# Patient Record
Sex: FEMALE | Employment: STUDENT | URBAN - METROPOLITAN AREA
[De-identification: names, ages, dates, MRNs, and addresses within clinical notes are randomized per-mention and may not be internally consistent; named-entity substitution may affect disease eponyms.]

---
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
## 2023-02-28 ENCOUNTER — DOCUMENTATION ONLY (OUTPATIENT)
Dept: FAMILY MEDICINE | Facility: CLINIC | Age: 18
End: 2023-02-28

## 2023-02-28 ENCOUNTER — OFFICE VISIT (OUTPATIENT)
Dept: FAMILY MEDICINE | Facility: CLINIC | Age: 18
End: 2023-02-28
Payer: COMMERCIAL

## 2023-02-28 VITALS
TEMPERATURE: 99.2 F | BODY MASS INDEX: 24.15 KG/M2 | DIASTOLIC BLOOD PRESSURE: 82 MMHG | WEIGHT: 178.3 LBS | HEIGHT: 72 IN | HEART RATE: 99 BPM | SYSTOLIC BLOOD PRESSURE: 135 MMHG

## 2023-02-28 DIAGNOSIS — K52.9 GASTROENTERITIS: ICD-10-CM

## 2023-02-28 DIAGNOSIS — R11.0 NAUSEA: Primary | ICD-10-CM

## 2023-02-28 RX ORDER — ONDANSETRON 4 MG/1
8 TABLET, ORALLY DISINTEGRATING ORAL EVERY 8 HOURS PRN
Qty: 20 TABLET | Refills: 0 | Status: SHIPPED | OUTPATIENT
Start: 2023-02-28

## 2023-02-28 NOTE — PROGRESS NOTES
"S: 18 yo UM female  presents with :  -Nausea  -Intermittent lower abd pain  -No emesis or diarrhea  -Woke up this am with abdominal pain lower  -+Chills  -No meds  -AT did a COVID antigen test and it was negative  -Played in a game against a player with GI issues  -Tomorrow is the first day of [employer]    O:  Upset about being sick  /82   Pulse 99   Temp 99.2  F (37.3  C) (Oral)   Ht 1.854 m (6' 1\")   Wt 80.9 kg (178 lb 4.8 oz)   BMI 23.52 kg/m    HEENT: neg  NecK: no lad  Lungs; cta  Heart; increased rate but regular  Abd:  Soft and nttp, no g/r      A:  Probable GI viral illness given high prevalence of norovirus in the community    P:  No practice today  Go home and rest  Zofran 8mg ODT q 8 hours prn nausea  Try to drink Powerade diluted with water  Alfalfa foods a little bit at a time  Recheck tomorrow at shoot around    El Milian, ATC was notified of the plan.   Isa Alonso MD, CAQ, FACSM, [facility]  Sports Medicine and Bone Health  Team Physician;  Athletics    "

## 2023-02-28 NOTE — LETTER
"2/28/2023  RE: Jaz Damon  [address] [place] [address]     Dear Colleague,  Thank you for referring your patient, Jaz Damon, to the [facility] STUDENT ATHLETIC CLINIC. Please see a copy of my visit note below.    S: 18 yo [employer] female  presents with :  -Nausea  -Intermittent lower abd pain  -No emesis or diarrhea  -Woke up this am with abdominal pain lower  -+Chills  -No meds  -AT did a COVID antigen test and it was negative  -Played in a game against a player with GI issues  -Tomorrow is the first day of [employer]    O:  Upset about being sick  /82   Pulse 99   Temp 99.2  F (37.3  C) (Oral)   Ht 1.854 m (6' 1\")   Wt 80.9 kg (178 lb 4.8 oz)   BMI 23.52 kg/m    HEENT: neg  NecK: no lad  Lungs; cta  Heart; increased rate but regular  Abd:  Soft and nttp, no g/r    A:  Probable GI viral illness given high prevalence of norovirus in the community    P:  No practice today  Go home and rest  Zofran 8mg ODT q 8 hours prn nausea  Try to drink Powerade diluted with water  Gila foods a little bit at a time  Recheck tomorrow at shoot around    El Milian, ATC was notified of the plan.   Isa Alonso MD, CAQ, FACSM, [facility]  Sports Medicine and Bone Health  Team Physician;  Athletics  "

## 2023-02-28 NOTE — PROGRESS NOTES
[facility] ATHLETICS  Giovana ATC initial assessment note  Date of service performed: 2/28/2023    Concern: Acute illness  Body part: N/A  Description: N/A  Injury: N/A  Type: Non-athletics related  Date of injury: 2/27/2023    S: During team meal before practice patient was brought over by  for evaluation of an illness.  Patient reports onset of symptoms late yesterday afternoon.  She reports chills/night sweats, body aches, fatigue, nausea and stomach pain.  Her symptoms are worse today.  She is not having any URI symptoms. No loss of taste/smell.  She has not had any vomiting or diarrhea. Recent contact with opponent during [employer] game with GI illness.      O: Normal mood and affect.  Emotional/upset about not feeling well.  Skin temp is warm to the touch.  /82. HR 99.  Oral temp 99.2F. NEGATIVE COVID antigen test.    A: Suspect viral GI illness    P: Referral to team MD for further evaluation and discussion of management plan.  Seen by Dr. Alonso today just shortly after seeing me prior to practice today.  See Epic for clinic note/plan. All questions answered.  Patient was in agreement with he plan.     Diego Milian, YONNY

## 2023-03-02 NOTE — PROGRESS NOTES
[facility] ATHLETICS  Giovana ATC follow-up note  Date of service performed: 3/2/2023    Concern/injury: Illness    Assessment/plan: Received forwarded text message from coaching staff.  Patient was seen in the ER this morning for progression of symptoms. I called patient's mom, Harriet, to discuss.  Harriet reports patient started to feel ill later in the evening while at a basketball game.  She started to have increasing chills and a temp of 102F.  She reported patient woke up this AM with a fever of 104F, L sided flank pain and smelly/discolored urine.  Harriet called their home clinic for appointment, who then called back and instructed to go to ER/urgent care.  Testing included negative mono test and negative US. UA positive for kidney infection.  Patient started on Keflex 500mg QID*7 days. ER visit was at [address] [place] [address] in [place].  We will plan to have patient f/u with Dr. Alonso next week.  Sooner if needed.  I reached out to patient and asked her to f/u if she needed anything or had questions.  I was not aware of the patient's worsening of symptoms last night as she did not communicante this to her medical team.     Diego Milian, ATC

## 2023-03-02 NOTE — PROGRESS NOTES
[facility] ATHLETICS  Giovana ATC follow-up note  Date of service performed: 3/1/2023    Concern/injury: Illness    Assessment/plan: Patient reports for f/u prior to SA this AM.  She is feeling better.  Slept through the night.  No vomiting or diarhea.  No chills/sweats throughout the night.  Woke up with some nauseas, but better since taking Zofran.  She has been able to eat.  Also noted a headache and some body aches which are feeling better.  She notes very mild sore throat today, but no coughing or congestion.  Patient feels she is able to play today.  Oral temp is 97.7F. Discussed case with Dr. Alonso who was in attendance at this AM's SA. Patient cleared to play as tolerated.  She participated in full 30 minute morning shoot around and played 20 mins in the game vs. [employer] @ the [employer].  Patient tolerated the game well. Team will be off now and the season is likely over with the loss this afternoon.      Diego Milian ATC

## 2023-03-14 ENCOUNTER — OFFICE VISIT (OUTPATIENT)
Dept: FAMILY MEDICINE | Facility: CLINIC | Age: 18
End: 2023-03-14
Payer: COMMERCIAL

## 2023-03-14 VITALS
HEART RATE: 77 BPM | SYSTOLIC BLOOD PRESSURE: 135 MMHG | WEIGHT: 176.4 LBS | DIASTOLIC BLOOD PRESSURE: 80 MMHG | BODY MASS INDEX: 23.89 KG/M2 | HEIGHT: 72 IN

## 2023-03-14 DIAGNOSIS — N12 PYELONEPHRITIS: Primary | ICD-10-CM

## 2023-03-14 NOTE — LETTER
"  3/14/2023      RE: Jaz Damon  [address] [facility] [address]     Dear Colleague,    Thank you for referring your patient, Jaz Damon, to the [facility] STUDENT ATHLETIC CLINIC. Please see a copy of my visit note below.    [facility] Athletic Medicine Clinic            SUBJECTIVE:     Jaz Damon is a 17 year old female  presenting to clinic today for follow up of L pyelonephritis roughly 2 weeks ago. Feels back to normal. No symptoms since initial. 7 days of antibiotics with Keflex QID. No missed doses. Has been off from basketball the past couple of weeks with spring break and season being over. Weight lifted and practice yesterday and today with no issues. Main symptoms were nausea, left flank pain, and one day of fever to Tmax of 104F.    Urine is normal color and frequency. Has been fever free for about two weeks.  No prior h/o UTI or pyelonephritis.    PMH, Medications and Allergies were reviewed and updated as needed.    ROS:  As noted above otherwise negative.    There is no problem list on file for this patient.      Current Outpatient Medications   Medication Sig Dispense Refill     ondansetron (ZOFRAN ODT) 4 MG ODT tab Take 2 tablets (8 mg) by mouth every 8 hours as needed for nausea (Patient not taking: Reported on 3/14/2023) 20 tablet 0              OBJECTIVE:     Vitals:   Vitals:    03/14/23 1431   BP: 135/80   Pulse: 77   Weight: 80 kg (176 lb 6.4 oz)   Height: 1.854 m (6' 1\")     BMI: Body mass index is 23.27 kg/m .    Gen:  Well nourished and in no acute distress  HEENT: Extraocular movement intact.  Neck: supple  CV: RRR. No murmurs, rubs, or gallops  Resp: Lungs CTA. NO evidence of consolidation, wheezing, rales, or crackles.   Skin: No rash, erythema, ecchymosis or obvious abnormality  Back: No CVAT.  Psych: Euthymic   Neuro: Alert and oriented.            ASSESSMENT & PLAN:      Jaz was seen today for medication problem and kidney " problem.    Diagnoses and all orders for this visit:    Pyelonephritis  -     Urine Culture Aerobic Bacterial; Future      16 yo [employer] female  with recent pyelonephritis. S/p one week of Keflex. Has been afebrile for two weeks with normal urine frequency and no  Symptoms. No CVAT today on exam. She has clinically recovered from illness.    - Urine culture for test of cure  - cleared to start progression of RTP for basketball without restriction     Options for treatment and/or follow-up care were reviewed with the patient and El. Patient was actively involved in the decision making process. Patient verbalized understanding and was in agreement with the plan.    Ed Keita DO  Primary Care Sports Medicine Fellow  Department of Family Medicine and [facility]      Attending Note:   I have  examined this patient and have reviewed the clinical presentation and progress note with the fellow. I agree with the treatment plan as outlined. The plan was formulated with the fellow on the day of the patient's visit.   Isa Alonso MD, CAQ, CCD  [facility]  Sports Medicine and Bone Health      Again, thank you for allowing me to participate in the care of your patient.      Sincerely,    Isa Aolnso MD

## 2023-03-14 NOTE — LETTER
Date:March 20, 2023      Patient was self referred, no letter generated. Do not send.        [facility] Information

## 2023-03-14 NOTE — PROGRESS NOTES
"[facility] Athletic Medicine Clinic            SUBJECTIVE:     Jaz Damon is a 17 year old female  presenting to clinic today for follow up of L pyelonephritis roughly 2 weeks ago. Feels back to normal. No symptoms since initial. 7 days of antibiotics with Keflex QID. No missed doses. Has been off from basketball the past couple of weeks with spring break and season being over. Weight lifted and practice yesterday and today with no issues. Main symptoms were nausea, left flank pain, and one day of fever to Tmax of 104F.    Urine is normal color and frequency. Has been fever free for about two weeks.  No prior h/o UTI or pyelonephritis.    PMH, Medications and Allergies were reviewed and updated as needed.    ROS:  As noted above otherwise negative.    There is no problem list on file for this patient.      Current Outpatient Medications   Medication Sig Dispense Refill     ondansetron (ZOFRAN ODT) 4 MG ODT tab Take 2 tablets (8 mg) by mouth every 8 hours as needed for nausea (Patient not taking: Reported on 3/14/2023) 20 tablet 0              OBJECTIVE:     Vitals:   Vitals:    03/14/23 1431   BP: 135/80   Pulse: 77   Weight: 80 kg (176 lb 6.4 oz)   Height: 1.854 m (6' 1\")     BMI: Body mass index is 23.27 kg/m .    Gen:  Well nourished and in no acute distress  HEENT: Extraocular movement intact.  Neck: supple  CV: RRR. No murmurs, rubs, or gallops  Resp: Lungs CTA. NO evidence of consolidation, wheezing, rales, or crackles.   Skin: No rash, erythema, ecchymosis or obvious abnormality  Back: No CVAT.  Psych: Euthymic   Neuro: Alert and oriented.            ASSESSMENT & PLAN:      Jaz was seen today for medication problem and kidney problem.    Diagnoses and all orders for this visit:    Pyelonephritis  -     Urine Culture Aerobic Bacterial; Future      16 yo [employer] female  with recent pyelonephritis. S/p one week of Keflex. Has been afebrile for two weeks with normal " urine frequency and no  Symptoms. No CVAT today on exam. She has clinically recovered from illness.    - Urine culture for test of cure  - cleared to start progression of RTP for basketball without restriction     Options for treatment and/or follow-up care were reviewed with the patient and , El. Patient was actively involved in the decision making process. Patient verbalized understanding and was in agreement with the plan.    Ed Keita DO  Primary Care Sports Medicine Fellow  Department of Family Medicine and [facility]  [facility]

## 2023-03-15 ENCOUNTER — LAB (OUTPATIENT)
Dept: LAB | Facility: CLINIC | Age: 18
End: 2023-03-15
Payer: COMMERCIAL

## 2023-03-15 DIAGNOSIS — N12 PYELONEPHRITIS: ICD-10-CM

## 2023-03-15 PROCEDURE — 99000 SPECIMEN HANDLING OFFICE-LAB: CPT | Performed by: PATHOLOGY

## 2023-03-15 PROCEDURE — 87086 URINE CULTURE/COLONY COUNT: CPT | Mod: 90 | Performed by: PATHOLOGY

## 2023-03-16 LAB — BACTERIA UR CULT: NO GROWTH

## 2023-03-17 NOTE — PROGRESS NOTES
[facility] ATHLETICS  Giovana ATC follow-up note  Date of service performed: 3/14/2023    Concern/injury: Illness    Assessment/plan: Patient doing well and has resolved all signs/symptoms associated with her pyelonephritis.  Patient was seen in f/u this AM by Dr. Alonso.  See clinic note.  She will undergo repeat urine culture.  She will be notified of the results.  She was cleared for full participation.      Deigo Milian ATC

## 2023-03-18 NOTE — PROGRESS NOTES
Attending Note:   I have  examined this patient and have reviewed the clinical presentation and progress note with the fellow. I agree with the treatment plan as outlined. The plan was formulated with the fellow on the day of the patient's visit.   Isa Alonso MD, CAQ, CCD  [facility]  Sports Medicine and Bone Health

## 2023-03-21 NOTE — PROGRESS NOTES
[facility] ATHLETICS  Giovana ATC follow-up note  Date of service performed: 3/20/2023    Concern/injury: Illness    Assessment/plan: Patient's urine culture was NEGATIVE.  Patient notified of results.  She will f/u PRN.    Diego Milian ATC

## 2023-04-17 ENCOUNTER — DOCUMENTATION ONLY (OUTPATIENT)
Dept: FAMILY MEDICINE | Facility: CLINIC | Age: 18
End: 2023-04-17

## 2023-04-17 NOTE — PROGRESS NOTES
[facility] ATHLETICS  Giovana ATC treatment plan note  Injury: L thigh  Date of injury: 4/15/2023    Date: 4/17/2023  Treatment: Following initial evaluation, treated L thigh with ICE with IFC 80-150Hz/1-10Hz*20 mins. Fabricated custom pad for practice.  Participated in full practice.  Tolerated well overall, but sore with activity.  Following team workouts,  treated L thigh with ICE with IFC 80-150Hz/1-10Hz*20 mins.    Giovana ATC treatment plan note  Injury: L thigh  Date of injury: 4/15/2023    Date: 4/18/2023  Treatment: Feeling better.  Improved AROM knee flexion, though still limited at end ROM.  Prior to practice, treated with HIVADOT*20 mins.  Participated in full team on court workouts.  Noted some pain with higher level change of directions.  Also modified team weights today as squats were painful.  Following team workouts, treated L thigh with ICE with IFC 80-150Hz/1-10Hz*20 mins.    Giovana ATC treatment plan note  Injury: L thigh  Date of injury: 4/15/2023    Date: 4/19/2023  Treatment: Reporting continued improvements.    Treatment: Feeling better.  Improved AROM knee flexion, though still limited at end ROM.  Prior to practice, treated with HIVADOT*20 mins. Reported tolerating practice better today.   Following team workouts, treated L thigh with ICE with IFC 80-150Hz/1-10Hz*20 mins.    Giovana ATC treatment plan note  Injury: L thigh  Date of injury: 4/15/2023    Date: 4/20/2023  Treatment: Doing well this AM.  Mild end ROM knee flexion pain.  Palpable hematoma is resolving and less firm.  Prior to team AM team lift, treated with HIVADOT*20 mins.  No team practice today.  Did not treat following workouts d/t class.      Giovana ATC treatment plan note  Injury: L thigh  Date of injury: 4/15/2023    Date: 4/21/2023  Treatment: Continuing to improve.   Prior to team AM team lift, treated with HIVADOT*20 mins.  No team practice today, only lift/speed assessment.  Patient did not report  following team activities.  Team is off over the weekend.     Diego Milian, ATC

## 2023-04-17 NOTE — PROGRESS NOTES
"[facility] ATHLETICS  [facility] ATC initial assessment note  Date of service performed: 4/17/2023    Concern: Acute injury  Body part: Thigh  Description: Left  Injury: Contusion  Type: Non-athletics related  Date of injury: 4/15/2023    S: Patient reports to [facility] this morning for evaluation of her L thigh.  She reports a direct blow during a scrimmage at an [employer] practice on Saturday.  She reports the injury happened towards the end of the scrimmage and she did not return.  She reports increased pain and swelling later following the injury.  She is having pain with walking as well as a sense of instability.  She denies numbness or tingling.  She has had thigh contusions in the past, but not this \"bad.\"    O: Normal gait.  On clinical exam of her L thigh there is no discoloration.  Mild swelling along mid anterolateral thigh.  Moderated TTP with mild appreciable fullness at the site of direct blow. Quad tone is mildly inhibited.  Knee AROM on the left 0-130, right 0-140. Able to perform a SLR, DL squat and SL squat, though reporting discomfort with all maneuvers.     A: L thigh contusion    P: Discussed the injury with the patient.  Discussed warning signs of acute compartment syndrome as well as myositis ossificans.  Discussed the treatment options including ICE with knee in flexion, electrotherapy, HIVADOT, and dry needling.  Patient elects to proceed with ICE with IFC with knee in flexion as well as use of HIVADOT manual techniques to start.  We also discussed padding to protect additional trauma. Patient will participate in activity as tolerated.  All questions answered.  Patient was in agreement with the plan.     Diego Milian ATC          "

## 2023-04-24 NOTE — PROGRESS NOTES
[facility] ATHLETICS  Giovana ATC follow-up note  Date of service performed: 4/24/2023    Concern/injury: L thigh    Assessment/plan: Doing well.  No pain.  Full knee AROM without pain.  Feels 100% back to normal.  Patient will f/u PRN.     Diego Milian ATC

## 2023-09-13 DIAGNOSIS — E55.9 VITAMIN D DEFICIENCY: ICD-10-CM

## 2023-09-13 DIAGNOSIS — R53.82 CHRONIC FATIGUE: Primary | ICD-10-CM

## 2023-09-18 ENCOUNTER — LAB (OUTPATIENT)
Dept: LAB | Facility: CLINIC | Age: 18
End: 2023-09-18
Payer: COMMERCIAL

## 2023-09-18 DIAGNOSIS — E55.9 VITAMIN D DEFICIENCY: ICD-10-CM

## 2023-09-18 DIAGNOSIS — R53.82 CHRONIC FATIGUE: ICD-10-CM

## 2023-09-18 LAB
FERRITIN SERPL-MCNC: 37 NG/ML (ref 6–175)
HGB BLD-MCNC: 13.9 G/DL (ref 11.7–15.7)
IRON BINDING CAPACITY (ROCHE): 275 UG/DL (ref 240–430)
IRON SATN MFR SERPL: 61 % (ref 15–46)
IRON SERPL-MCNC: 167 UG/DL (ref 37–145)

## 2023-09-18 PROCEDURE — 82306 VITAMIN D 25 HYDROXY: CPT | Performed by: FAMILY MEDICINE

## 2023-09-18 PROCEDURE — 82728 ASSAY OF FERRITIN: CPT | Performed by: FAMILY MEDICINE

## 2023-09-18 PROCEDURE — 83550 IRON BINDING TEST: CPT | Performed by: FAMILY MEDICINE

## 2023-09-18 PROCEDURE — 85018 HEMOGLOBIN: CPT | Performed by: FAMILY MEDICINE

## 2023-09-20 LAB — DEPRECATED CALCIDIOL+CALCIFEROL SERPL-MC: 84 UG/L (ref 20–75)

## 2023-10-28 ENCOUNTER — DOCUMENTATION ONLY (OUTPATIENT)
Dept: FAMILY MEDICINE | Facility: CLINIC | Age: 18
End: 2023-10-28

## 2023-10-28 NOTE — PROGRESS NOTES
[facility] ATHLETICS  Giovana ATC initial assessment note  Date of service performed: 10/28/2023    Concern: Acute injury  Body part: Thumb  Description: Right  Injury: Sprain, possible fractuce  Type: Athletics related  Date of injury: 10/29/2023    S: Patient reports following today's scrimmage at [facility] for evaluation of her R thumb.  She injured it on the very first play of the game attempting to deflect a pass.  She felt her thumb was forced into flexion.  She was able to continue the game, but had difficulty catching and shooting d/t pain as well as progressive swelling. She localizes her pain along the first MCP joint and along the first metacarpal.  She denies prior history of R thumb injury.  She is R hand dominant.     O: On clinical exam of her R thumb, there is no apparent deformity present.  Moderate swelling through the thenar eminence. Mild swelling along the first MCP joint.  She is markedly TTP along both the UCL>RCL as well as the entire shaft of the first metacarpal.  Her first CMC joint is NTTP. She is able to flex/extend her DIP joint.  She is able to make a fist.  ABDuction/ADDuction are limited d/t pain as are flexion/extension.  She is stable to varus/valgus stress though both are painful  (+) Axial load (++) distraction/rotation.     A: R thumb MCP sprain, r/o fracture    P: Discussed the clinical exam today with the patient.  I have recommend f/u with ortho tomorrow at the  [facility].  She will see Dr. Hamilton at 10:45am for exam as well as XR with C-arm to include both traditional views as well as possible stress views.  In the interim, I have recommended ice for pain control/swelling.  We discussed the use of APAP/NSAID's PRN for her pain.  Also have recommend a hand based thumb splint for protection in the short term.      Diego Milian, ATC

## 2023-10-29 ENCOUNTER — OFFICE VISIT (OUTPATIENT)
Dept: ORTHOPEDICS | Facility: CLINIC | Age: 18
End: 2023-10-29
Payer: COMMERCIAL

## 2023-10-29 DIAGNOSIS — S63.641A SPRAIN OF METACARPOPHALANGEAL (MCP) JOINT OF RIGHT THUMB, INITIAL ENCOUNTER: Primary | ICD-10-CM

## 2023-10-29 NOTE — LETTER
10/29/2023         RE: Jaz Damon  [address] [place] [address]        Dear Colleague,    Thank you for referring your patient, Jaz Damon, to the [facility] ORTHOPEDIC CLINIC [place]. Please see a copy of my visit note below.    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  She jammed her thumb when deflecting the past of the medial scrimmage.  She was able to complete the scrimmage with her thumb taped.  She has ecchymosis over the thenar eminence and the first MCP joint.  She localizes the pain to the first MCP joint.  She does not believe she dislocated the finger.  She denies any prior injury to this finger.  She denies any other pain or injury to the hand.  No numbness or tingling in the thumb.  Able to flex and extend at the MCP and IP joint with some pain in the MCP.    Objective:   PE:  RUE: No pain with wrist flexion, extension, radial or ulnar deviation.  Mild ecchymosis over the base of the thenar eminence.  No pain to palpation in the anatomic snuffbox or at the CMC joint.  No pain to palpation over the MCP shaft.  Pain to palpation over the radial aspect of the MCP joint and mildly over the ulnar aspect.  Ecchymosis at the MCP joint.  Full passive range of motion MCP.  Free and pedis range of motion P joint.  4/5  strength limited by pain in the thumb.  4/5 opposition, abduction, and abduction limited by pain.  MCP stable to varus and valgus stress at 0 and 90 degrees of flexion.    Imaging:   AP and lateral x-ray of the thumb including varus and valgus stress x-rays at the MCP joint were reviewed.  This shows no fracture, or acute bony pathology.  No signs of subluxation or dislocation.  Stress exam show approximately 2 mm of widening of the joint line on both varus and valgus stress.  I compared this to the contralateral thumb and they were  equivalent.    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  No fracture on x-ray, or signs of instability.  No signs of radial or ulnar collateral ligament injury on stress x-ray.  We will treat her thumb based on her symptoms.  Icing, taping, oral NSAIDs.  She can return to play as symptoms allow.    Jay Hamilton  Orthopedic Surgery       Again, thank you for allowing me to participate in the care of your patient.        Sincerely,        Jay Hamilton MD

## 2023-10-29 NOTE — PROGRESS NOTES
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  She jammed her thumb when deflecting the past of the medial scrimmage.  She was able to complete the scrimmage with her thumb taped.  She has ecchymosis over the thenar eminence and the first MCP joint.  She localizes the pain to the first MCP joint.  She does not believe she dislocated the finger.  She denies any prior injury to this finger.  She denies any other pain or injury to the hand.  No numbness or tingling in the thumb.  Able to flex and extend at the MCP and IP joint with some pain in the MCP.    Objective:   PE:  RUE: No pain with wrist flexion, extension, radial or ulnar deviation.  Mild ecchymosis over the base of the thenar eminence.  No pain to palpation in the anatomic snuffbox or at the CMC joint.  No pain to palpation over the MCP shaft.  Pain to palpation over the radial aspect of the MCP joint and mildly over the ulnar aspect.  Ecchymosis at the MCP joint.  Full passive range of motion MCP.  Free and pedis range of motion P joint.  4/5  strength limited by pain in the thumb.  4/5 opposition, abduction, and abduction limited by pain.  MCP stable to varus and valgus stress at 0 and 90 degrees of flexion.    Imaging:   AP and lateral x-ray of the thumb including varus and valgus stress x-rays at the MCP joint were reviewed.  This shows no fracture, or acute bony pathology.  No signs of subluxation or dislocation.  Stress exam show approximately 2 mm of widening of the joint line on both varus and valgus stress.  I compared this to the contralateral thumb and they were equivalent.    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  No fracture on x-ray, or signs of instability.  No signs of radial or ulnar collateral ligament injury on stress x-ray.  We will treat her thumb  based on her symptoms.  Icing, taping, oral NSAIDs.  She can return to play as symptoms allow.    Jay Hamilton  Orthopedic Surgery

## 2023-10-31 NOTE — PROGRESS NOTES
[facility] ATHLETICS  [facility] treatment plan note  Injury: R thumb  Date of injury: 10/29/2023    Date: 10/30/2023  Treatment: Team was off yesterday.  Patient was seen in the  ATR by Dr. Hamilton.  See clinic note for assessment, imaging results, and plan.  Today, patient reports feeling the same.  She has mild to moderate swelling along the first digit and through the thenar eminence.  There is mild ecchymosis present through the first webspace and thenar eminence.  We treated with light therapy, 6J/cm2 *4 spots.  I fashioned a thumb based splint from thermoldable tape and secured in place with Elastikon in a spica fashion.  Patient limited in practice today and was held from live basketball.  She tolerated workouts well.  Treated with ice to go following team workouts.     [facility] treatment plan note  Injury: R thumb  Date of injury: 10/29/2023    Date: 10/31/2023  Treatment: Feeling about the same today.  Decreased swelling note.  No change to ecchymosis.  Treated MCP joint/thenar eminence with US 3Mhz, 1.0w/cm2*5min@100%.  Tape for practice.  Patient was able to participate in full practice.  Reported tolerating well, but feeling sore post practice.  Treated with ice to go following practice.  Team is off from workouts tomorrow.     [facility] treatment plan note  Injury: R thumb  Date of injury: 10/29/2023    Date: 11/2/2023  Treatment: Team was off yesterday.  Feeling a bit better today.  Improved swelling, ROM, and pain.  Treated with light therapy, 6J/cm2 *4 spots followed by  US 3Mhz, 1.0w/cm2*5minn@100%. Tape for practice.  Discussed case with Dr. Hamilton.  Patient will start 7 day course of diclofenac.  He sent in Rx to [facility] pharmacy.  Discussed medication with patient.  She participated in full practice today.  Treated with ice following practice.     [facility] treatment plan note  Injury: R thumb  Date of injury: 10/29/2023    Date: 11/3/2023  Treatment: Feeling better today.   Treated MCP joint/thenar eminence with US 3Mhz, 1.0w/cm2*5min@100%.  Tape for practice.  Patient participated in full practice. Felt better in practice today.     [facility] treatment plan note  Injury: R thumb  Date of injury: 10/29/2023    Date: 11/4/2023  Treatment: Continues to improve.  Treated MCP joint/thenar eminence with US 3Mhz, 1.0w/cm2*5min@100%.  Tape for practice.  Patient participated in full practice. Team is off from workouts tomorrow.     Diego Milian, ATC

## 2023-11-02 RX ORDER — DICLOFENAC SODIUM 75 MG/1
75 TABLET, DELAYED RELEASE ORAL 2 TIMES DAILY
Qty: 14 TABLET | Refills: 0 | Status: SHIPPED | OUTPATIENT
Start: 2023-11-02

## 2023-11-07 NOTE — PROGRESS NOTES
[facility] ATHLETICS  [facility] treatment plan note  Injury: R thumb  Date of injury: 10/29/2023    Date: 11/6/2023  Treatment: Team was off from workouts yesterday.  Patient reports feeling much better.  Marked improvements noted in swelling and ecchymosis.  She did not wish to treat today.  Taped for practice, elected to tape without the thermoplastic splint today.  She was a full participant.  She did not report following practice.     [facility] treatment plan note  Injury: R thumb  Date of injury: 10/29/2023    Date: 11/7/2023  Treatment: Doing well.  Tape for practice.  Participated in full practice.  She did not report following practice.     [facility] treatment plan note  Injury: R thumb  Date of injury: 10/29/2023    Date: 11/8/2023  Treatment: Doing well.  Tape for afternoon SA.  Re-tape for game vs. [place] [employer].  She played 28 minutes in the game tonight.  Team is off from workouts tomorrow.     [facility] treatment plan note  Injury: R thumb  Date of injury: 10/29/2023    Date: 11/10/2023  Treatment: Doing well.  Denies notable issues with her thumb.  Elected not to tape today.  Participated in full practice.  She did not report following practice.     [facility] treatment plan note  Injury: R thumb  Date of injury: 10/29/2023    Date: 11/11/2023  Treatment: Did not tape today.  Participated in full practice.  Patient doing well with respect to her thumb.  She will f/u PRN.     Diego Milian ATC

## 2024-01-24 ENCOUNTER — DOCUMENTATION ONLY (OUTPATIENT)
Dept: FAMILY MEDICINE | Facility: CLINIC | Age: 19
End: 2024-01-24

## 2024-01-24 NOTE — PROGRESS NOTES
[facility] ATHLETICS  [facility] initial assessment note  Date of service performed: 1/24/2024    Concern: Acute injury  Body part: Ankle  Description: Right  Injury: Sprain  Type: Athletics related  Date of injury: 1/23/2024    S: Patient reports to ATR this afternoon for evaluation of her R ankle.  She reports stepping on opponents foot and rolling her ankle.  The injury happened late in the 4th quarter.  She was able to return to the game with additional tape.  Her pain is lateral and medial.  She has had prior history of ankle sprains.  Her most recent ankle sprain was this past May when she slipped on a wet spot on the floor at an [employer] practice.     O: Normal gait.  Mild soft tissue swelling medial>lateral ankle.  No discoloration. (-) [place] foot/ankle rules. TTP along ATFL and CFL; also TTP just inferior to medial malleolus and along distal posterior tib tendon.  Ankle AROM is full and symmetric with pain at end ROM plantar flexion; limited inversion/eversion with pain at end ROM. Strength - DF 5/5; Inv 4+/5P; Ev 5-/5. (+) Anterior drawer (+) talar tilt inversion (-) long bone compression (-) bump (-) Talar tilt eversion (-) Luh     A: R lateral ankle sprain    P: Recommend ICE protocol, as well as use of firefly NMES units nightly.  Discussed AROM exercises.  Team is off today, but activity will progress as tolerated.  Plan to work on ankle strength and proprioception.  Will treat in ATR with modalities PRN. Patient was given the opportunity to ask questions.  All questions answered.  She was in agreement with the plan.     Diego Milian, ATC

## 2024-01-24 NOTE — PROGRESS NOTES
[facility] ATHLETICS  [facility] treatment plan note  Injury: R ankle  Date of injury: 1/23/2024    Date: 1/24/2024  Treatment: Treated R ankle with IFC 80-150Hz with ice*30mins. Patient tolerated treatment well. Team is off from workouts today.     [facility] treatment plan note  Injury: R ankle  Date of injury: 1/23/2024    Date: 1/25/2024  Treatment: Feeling better.  Treated with IFC 80-150Hz*20mins.  Tape for practice.  Team ended up watching film only today.  Following team film session, treated with CWP*15mins.     [facility] treatment plan note  Injury: R ankle  Date of injury: 1/23/2024    Date: 1/26/2024  Treatment: Feeling better.  Treated with IFC 80-150Hz*20mins. Completed 3 way T-band 2*15ea and SL balance 5*20s.  Tape for practice.  Participated in full practice.  Tolerated well overall, but felt increasing pain with full court work.  Took ice to go following practice.     [facility] treatment plan note  Injury: R ankle  Date of injury: 1/23/2024    Date: 1/27/2024  Treatment: Did not come early enough for treatment/rehab.  Feeling a bit more sore today.  Slight increase in ankle swelling as well, but still mild at most.  Tape for practice.  Participated in full practice.  Tolerated well. Treated with CWP following workotus.     Diego Milian ATC

## 2024-01-29 NOTE — PROGRESS NOTES
[facility] ATHLETICS  [facility] treatment plan note  Injury: R ankle  Date of injury: 1/23/2024    Date: 1/28/2024  Treatment: Doing better.  Tape for SA.  Re-tape for game.  Patient played 33mins in the game @ [place].  Tolerated well.  Did not treat following the game.     [facility] treatment plan note  Injury: R ankle  Date of injury: 1/23/2024    Date: 1/29/2024  Treatment: Continued mild swelling around the ankle.  Symptoms are mild.  Has been doing HEP.  Tape for practice.  Team had walk through only today.  Treated with CWP following team activities.     [facility] treatment plan note  Injury: R ankle  Date of injury: 1/23/2024    Date: 1/30/2024  Treatment: Improved swelling.  No notable discomfort.  Tape for practice.  Participated in full practice.  Tolerated well.  Did not treat following practice.     [facility] treatment plan note  Injury: R ankle  Date of injury: 1/23/2024    Date: 1/31/2024  Treatment: Doing well.  Compliant with HEP exercises.  Tape for SA.  Re-tape for game.  Patient played 35 mins in the game vs. [facility].  She did not report following the game. Team is off from workouts tomorrow.     [facility] treatment plan note  Injury: R ankle  Date of injury: 1/23/2024    Date: 2/2/2024  Treatment: Doing well.  Tape for practice.  Participated in full practice.     [facility] treatment plan note  Injury: R ankle  Date of injury: 1/23/2024    Date: 2/3/2024  Treatment: Reports continuing her rehab at home.  Tape for practice.  Participated in full practice.     Diego Milian ATC

## 2024-02-04 NOTE — PROGRESS NOTES
[facility] ATHLETICS  [facility] treatment plan note  Injury: R ankle  Date of injury: 1/23/2024    Date: 2/4/2024  Treatment: Tape for practice.  Participated in full practice.     [facility] treatment plan note  Injury: R ankle  Date of injury: 1/23/2024    Date: 2/5/2024  Treatment: Tape for SA.  Re-tape for game.  Patient played 36 mins in the game tonight @ [facility].      [facility] treatment plan note  Injury: R ankle  Date of injury: 1/23/2024    Date: 2/6/2024  Treatment: Tape for practice.  Participated in full practice.     [facility] treatment plan note  Injury: R ankle  Date of injury: 1/23/2024    Date: 2/7/2024  Treatment:  Tape for practice.  Participated in full practice.     [facility] treatment plan note  Injury: R ankle  Date of injury: 1/23/2024    Date: 2/8/2024  Treatment: Tape for SA.  Re-tape for game.  Patient played 38 mins in the game vs. [facility].  Team is off from workouts the next two days.  She continues to do well without limitations.  She has wanted to work on a HEP only for her rehab.  She is no longer actively treating.  Plan to tape for prophylactic purposes.  Otherwise f/u PRN.     Diego Milian ATC

## 2024-03-02 ENCOUNTER — DOCUMENTATION ONLY (OUTPATIENT)
Dept: FAMILY MEDICINE | Facility: CLINIC | Age: 19
End: 2024-03-02

## 2024-03-05 NOTE — PROGRESS NOTES
[facility] ATHLETICS  [facility] AT[facility] initial assessment note  Date of service performed: 3/2/2024    Concern: Chronic injury  Body part: Lower leg  Description: Right  Injury: Stress  Type: Athletics related  Date of injury: insidious onset     S: Patient reports to the ATR today following practice for evaluation of her R lower leg.  She notes insidious onset of progressive symptoms since the end of January.  Her pain is aching.  She reports her pain to be between a 4-5/10 at its worst.  She has no rest pain. She typically has no pain with ADL's unless it is after practice for a short period of time. She denies night pain. She denies prior history of injury to this area. No prior history of BSI. 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 She occasionally takes ibuprofen.  She has not had any treatments. Her vitamin D was 84 last drawn on 9/18/23.  She continues maintenance Vit D supplementation.      O: Normal gait.  Stands with mild bilateral hindfoot valgus.  She is able to toe and heel rise.  Her arch is flexible.  She is markedly TTP along the distal 1/3 of the posteromedial tibia as well posteromedial soft tissues.  PROM DF is to 10 degrees past neutral. She has pain with resisted ankle inversion as well as passive ankle eversion. (-) Bump test    A: R medial tibial stress syndrome vs. stress reaction    P: Discussed the clinical exam along with the patient's history.  We dicussed her injury as a bone stress continuum. We discussed activity modifications vs. cessation of impact activity.  Given the point in the season, the patient does not wish to stop activity, so we discussed modifying additional impact activity outside of basketball.  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 Nor does she wish to see a team MD to help with her management plan.  We dicussed treatment options including  trying an off the shelf orthotics, ultrasound, light therapy, as well as manual therapies including dry needling, ART, and [place].  Lastly we discussed the possibility of her injury progressing further down the continuum.       Diego Milian ATC

## 2024-03-05 NOTE — PROGRESS NOTES
[facility] ATHLETICS  [facility] treatment plan note  Injury: R lower leg  Date of injury: chronic    Date: 3/4/2024  Treatment: Patient reports for treatment today.  Felt sore during the game as well as afterwards.  Not having pain with walking today.  Denies rest pain or night pain.  Treated distal 1/3 posteromedial tibia with infrared light therapy 6J/cm2*6 spots as well as with US 1Mhz, 1.2w/cm2*7mins@100%.  Patient tolerated treatment well.  Team did not practice today.      [facility] treatment plan note  Injury: R lower leg  Date of injury: chronic    Date: 3/5/2024  Treatment: Did not report prior to practice today for treatment.  Participated in full practice.  Treated B legs with CWP following practice.     [facility] treatment plan note  Injury: R lower leg  Date of injury: chronic    Date: 3/6/2024  Treatment: Played 37 mins in the game vs. [employer].  Treated B legs with CWP following practice.     [facility] treatment plan note  Injury: R lower leg  Date of injury: chronic    Date: 3/7/2024  Treatment: Played 35 mins in the game vs. [place].  Team will be off from workouts over the next several days following the loss tonight.     Diego Milian ATC

## 2024-03-12 NOTE — PROGRESS NOTES
[facility] ATHLETICS  [facility] treatment plan note  Injury: R lower leg  Date of injury: chronic    Date: 3/11/2024  Treatment: Team resumes workouts today.  Patient refrained from impact activity the last 3 days.  Reports shin feels the same.  ADL's are mainly pain free, but notes occasionally pain with stairs.  Denies rest pain or night pain.   Treated distal 1/3 posteromedial tibia with infrared light therapy 6J/cm2*6 spots as well as with US 1Mhz, 1.2w/cm2*7mins@100%.  Patient tolerated treatment well.  She participated in full practice.  Treated B legs with CWP following practice.     [facility] treatment plan note  Injury: R lower leg  Date of injury: chronic    Date: 3/12/2024  Treatment: Feels more tender to touch today, but denies rest pain or pain with ADL's.  Treated distal 1/3 posteromedial tibia with infrared light therapy 6J/cm2*6 spots as well as with US 1Mhz, 1.2w/cm2*7mins@100%.  Patient tolerated treatment well.  She participated in full practice.  Treated with CWP following workouts.     [facility] treatment plan note  Injury: R lower leg  Date of injury: chronic    Date: 3/13/2024  Treatment: Team practiced at 6am this morning.  Patient did not report for treatment.  Participated in full practice.  Did not report following practice.     [facility] treatment plan note  Injury: R lower leg  Date of injury: chronic    Date: 3/14/2024  Treatment:  Treated distal 1/3 posteromedial tibia with infrared light therapy 6J/cm2*6 spots as well as with US 1Mhz, 1.2w/cm2*7mins@100%.  Patient tolerated treatment well.  She participated in full practice.  She did not report following practice.     [facility] treatment plan note  Injury: R lower leg  Date of injury: chronic    Date: 3/15/2024  Treatment: Team practiced at 6am this morning.  Patient did not report for treatment.  Participated in full practice.  Did not report following practice.  Team is off from workouts over the weekend.      Diego Milian, ATC

## 2024-03-19 NOTE — PROGRESS NOTES
[facility] ATHLETICS  [facility] treatment plan note  Injury: R lower leg  Date of injury: chronic    Date: 3/18/2024  Treatment: Team was off over the weekend.  Patient reports minimal issues with ADL's.  Denies rest pain/aching or pain at the end of the day.  Treated distal 1/3 posteromedial tibia with infrared light therapy 6J/cm2*6 spots as well as with US 1Mhz, 1.2w/cm2*7mins@100%.  Patient tolerated treatment well.  She participated in full practice. She did not report following practice.     [facility] treatment plan note  Injury: R lower leg  Date of injury: chronic    Date: 3/19/2024  Treatment: Feels the same today. Patient reports minimal issues with ADL's.  Denies rest pain/aching or pain at the end of the day.  Treated distal 1/3 posteromedial tibia with infrared light therapy 6J/cm2*6 spots as well as with US 1Mhz, 1.2w/cm2*7mins@100%.  Patient tolerated treatment well.  She participated in full practice.  Felt increased soreness with full court work today, otherwise felt better in workouts today.  Treated B legs with CWP following workouts.     [facility] treatment plan note  Injury: R lower leg  Date of injury: chronic    Date: 3/20/2024  Treatment:  Treated distal 1/3 posteromedial tibia with infrared light therapy 6J/cm2*6 spots as well as with US 1Mhz, 1.2w/cm2*7mins@100%.  Patient tolerated treatment well.  She participated in full practice.   Treated B legs with CWP following workouts.     [facility] treatment plan note  Injury: R lower leg  Date of injury: chronic    Date: 3/21/2024  Treatment: Treated distal 1/3 posteromedial tibia with infrared light therapy 6J/cm2*6 spots as well as with US 1Mhz, 1.2w/cm2*7mins@100%.  Patient tolerated treatment well.  She participated in full practice.  Treated B legs with CWP following workouts.     [facility] treatment plan note  Injury: R lower leg  Date of injury: chronic    Date: 3/22/2024  Treatment:  Treated distal 1/3 posteromedial  tibia with infrared light therapy 6J/cm2*6 spots as well as with US 1Mhz, 1.2w/cm2*7mins@100%.  Patient tolerated treatment well.  She participated in full practice.  Team is off from workouts tomorrow.      Diego Milian, ATC

## 2024-03-24 NOTE — PROGRESS NOTES
[facility] ATHLETICS  [facility] ATC treatment plan note  Injury: R lower leg  Date of injury: chronic    Date: 3/24/2024  Treatment: Team was off from workouts yesterday.  Treated distal 1/3 posteromedial tibia  US 1Mhz, 1.5w/cm2*7mins@100%.  Patient tolerated treatment well.  She participated in full practice. She did not report following practice.     [facility] treatment plan note  Injury: R lower leg  Date of injury: chronic    Date: 3/25/2024  Treatment: Treated distal 1/3 posteromedial tibia  US 1Mhz, 1.5w/cm2*7mins@100%.  Patient tolerated treatment well.  She participated in full practice. She did not report following practice.     [facility] treatment plan note  Injury: R lower leg  Date of injury: chronic    Date: 3/26/2024  Treatment: Prior to the game tonight, reated distal 1/3 posteromedial tibia  US 1Mhz, 1.5w/cm2*7mins@100%.  Patient tolerated treatment well.  She played 33 mins in the game tonight vs. [email or web address].     [facility] treatment plan note  Injury: R lower leg  Date of injury: chronic    Date: 3/27/2024  Treatment: No significant changes.  Prior to practice today, treated distal 1/3 posteromedial tibia  US 1Mhz, 1.5w/cm2*7mins@100%.  Patient tolerated treatment well.  She participated in full practice.  Note practice was light shooting drills and team walk through.  Following practice, treated B legs with [employer].     [facility] treatment plan note  Injury: R lower leg  Date of injury: chronic    Date: 3/28/2024  Treatment: Prior to practice today, treated distal 1/3 posteromedial tibia  US 1Mhz, 1.5w/cm2*7mins@100%.  Patient tolerated treatment well.    [facility] treatment plan note  Injury: R lower leg  Date of injury: chronic    Date: 3/29/2024  Treatment: Prior to the game tonight, treated distal 1/3 posteromedial tibia  US 1Mhz, 1.5w/cm2*7mins@100%.  Patient tolerated treatment well.  She played 38 mins in the game [employer] vs. [employer].     [facility] treatment  plan note  Injury: R lower leg  Date of injury: chronic    Date: 3/30/2024  Treatment: Prior to team activities, treated distal 1/3 posteromedial tibia with infrared light therapy 6J/cm2*6 spots as well as with US 1Mhz, 1.5w/cm2*7mins@100%. Patient tolerated treatment well.  Team had walk through only today.     Diego Milian, ATC

## 2024-03-31 NOTE — PROGRESS NOTES
[facility] ATHLETICS  [facility] treatment plan note  Injury: R lower leg  Date of injury: chronic    Date: 3/31/2024  Treatment: Prior to team activities, treated distal 1/3 posteromedial tibia with infrared light therapy pads 6J/cm2 as with US 1Mhz, 1.5w/cm2*7mins@100%. Patient tolerated treatment well.  She participated in full practice.     [facility] treatment plan note  Injury: R lower leg  Date of injury: chronic    Date: 4/1/2024  Treatment: Played 36 mins in the game @ [employer].      [facility] treatment plan note  Injury: R lower leg  Date of injury: chronic    Date: 4/2/2024  Treatment: Team had light practice/walk through only today @ [place], [place].     [facility] treatment plan note  Injury: R lower leg  Date of injury: chronic    Date: 4/3/2024  Treatment: Played 38 mins in the game [employer] @ [place].  Team is off from workouts tomorrow.     [facility] treatment plan note  Injury: R lower leg  Date of injury: chronic    Date: 4/5/2024  Treatment: Participated on full practice today on [facility].      [facility] treatment plan note  Injury: R lower leg  Date of injury: chronic    Date: 4/6/2024  Treatment: Patient played 37 mins in the game vs. [place] for the [employer] championship game on the [facility].  Competitive season as ended.  Team will be off for at least the next week per [employer] bylaws.  Patient will f/u next week for recheck and further discussion of her management plan.     Diego Milian ATC

## 2024-04-17 NOTE — PROGRESS NOTES
[facility] ATHLETICS  [facility] treatment plan note  Injury: R lower leg  Date of injury: chronic     Date: 4/16/2024  Treatment: Team resumes post-season workouts today.  Patient has been off since the season ended.  She denies pain or issues with her shin.  She would like to see how things feel during post-season workouts.  She participated in full 60 minute team practice without reportable symptoms.     [facility] treatment plan note  Injury: R lower leg  Date of injury: chronic    Date: 4/17/2024  Treatment: Continues to do well.  She participated in full 60 minute team practice without reportable symptoms. Team will not have on court activities until next week.      Diego Milian ATC

## 2024-04-25 NOTE — PROGRESS NOTES
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  Patient had no issues or symptoms with activity.  Notes some tenderness, but improving.  Discussed seeing how things go over the next month as we reduce her activity level with emphasis on strength training and non-impact activity.  She understands to reduce her overall impact and progress to pain free activity.  She will f/u prior to the start of summer workouts.     Diego Milian ATC

## 2024-08-02 NOTE — PROGRESS NOTES
[facility] ATHLETICS  [facility] follow-up note  Date of service performed: 8/2/2024    Concern/injury: R lower leg    Assessment/plan: Patient completed full summer training session with no notable/reported issues.  She was a full participant.  Team has now ended their 7 week summer training session.  Patient was a full participant.  Team is off over the next 3 weeks.  She will follow up PRN.     Diego Milian ATC

## 2024-09-23 DIAGNOSIS — E55.9 VITAMIN D DEFICIENCY: ICD-10-CM

## 2024-09-23 DIAGNOSIS — R53.82 CHRONIC FATIGUE: Primary | ICD-10-CM

## 2024-09-25 ENCOUNTER — LAB (OUTPATIENT)
Dept: LAB | Facility: CLINIC | Age: 19
End: 2024-09-25
Payer: COMMERCIAL

## 2024-09-25 ENCOUNTER — APPOINTMENT (OUTPATIENT)
Dept: LAB | Facility: CLINIC | Age: 19
End: 2024-09-25
Payer: COMMERCIAL

## 2024-09-25 DIAGNOSIS — E55.9 VITAMIN D DEFICIENCY: ICD-10-CM

## 2024-09-25 DIAGNOSIS — R53.82 CHRONIC FATIGUE: ICD-10-CM

## 2024-09-25 LAB
FERRITIN SERPL-MCNC: 43 NG/ML (ref 6–175)
HGB BLD-MCNC: 13 G/DL (ref 11.7–15.7)
IRON BINDING CAPACITY (ROCHE): 298 UG/DL (ref 240–430)
IRON SATN MFR SERPL: 33 % (ref 15–46)
IRON SERPL-MCNC: 99 UG/DL (ref 37–145)
VIT D+METAB SERPL-MCNC: 54 NG/ML (ref 20–50)

## 2024-09-25 PROCEDURE — 83550 IRON BINDING TEST: CPT

## 2024-09-25 PROCEDURE — 82728 ASSAY OF FERRITIN: CPT

## 2024-09-25 PROCEDURE — 85018 HEMOGLOBIN: CPT

## 2024-09-25 PROCEDURE — 82306 VITAMIN D 25 HYDROXY: CPT

## 2024-11-04 ENCOUNTER — OFFICE VISIT (OUTPATIENT)
Dept: ORTHOPEDICS | Facility: CLINIC | Age: 19
End: 2024-11-04
Payer: COMMERCIAL

## 2024-11-04 DIAGNOSIS — S99.929A INJURY OF NAIL BED OF TOE: Primary | ICD-10-CM

## 2024-11-06 NOTE — PROGRESS NOTES
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  This was sustained yesterday.  She stubbed her toe on a door that was open unexpectedly towards her.  She sustained an avulsion injury of the toenail of the right great toe.  This involves approximately 60 to 75%.  Any subungual hematoma was released.  She has been treating since that time with padding and a Band-Aid.  She has been able to practice yesterday and participate in today's game.  However she continues to have pain over the great toe and feelings of catching.  She denies any prior injury to this toe.    Objective:   PE:  RLE: There is approximately 75% avulsion of the toenail of the right great toe.  There is still soft tissue adhesions along the nailbed and the medial nail fold.  Not appreciate any nailbed injury or lacerations.  No open wounds or lacerations.  Sensation intact on the medial and lateral aspect of the toe.  2-second cap refill.    Procedure:   Verbal informed consent for right great toe digital nerve block and nail removal was obtained from the patient after discussing the risk and benefits of the procedure.  Risk and benefits including but not limited to bleeding, infection, failure to cure pain, nail growth deformity, and hook nail were discussed.  I first began with a digital nerve block.  6 cc of 1% lidocaine without epinephrine were drawn.  The great toe was sterilely prepped and draped in the normal fashion.  A digital nerve block was performed by injecting 2 cc of lidocaine in the plantar medial and plantar lateral aspect of the proximal phalanx.  The remaining 2 cc were then injected subcutaneously over the dorsal aspect of the great toe.    After appropriate local anesthetic was confirmed, the great toe was sterilely prepped and draped in the normal fashion.  Blunt dissection deep to the nail bed and underneath the cuticle freed  the remaining toenail.  It was removed in its entirety.  The nail bed was examined.  There were found to be no lacerations or open wounds.  Given the athletes high level competitive sport, I elected not to attempt to repair the nail back into the nailbed as this would likely be avulsed during her competitive sport.  Antiseptic ointment was placed.  Soft dressings were placed.  The patient tolerated the procedure without difficulty.    A/P:  Patient is a 19-year-old female seen here today for partial avulsion of her right great toe nail.  At this time approximately 70% of the toenail has been avulsed.  Discussed treatment options.  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  We discussed intervention the form of bedside toenail removal.  Ultimately she elected to move forward with this as it continues to catch on her socks and clothing.  Right great toe digital block and nail removal was performed as highlighted above.  She tolerated procedure without difficulty.  She is cleared to return to her desired athletic activity as symptoms allow.    Jay Hamilton MD    [facility]   Department of Orthopedic Surgery      Disclaimer: This note consists of symbols derived from keyboarding, dictation and/or voice recognition software. As a result, there may be errors in the script that have gone undetected. Please consider this when interpreting information found in this chart.

## 2024-11-13 ENCOUNTER — OFFICE VISIT (OUTPATIENT)
Dept: ORTHOPEDICS | Facility: CLINIC | Age: 19
End: 2024-11-13
Payer: COMMERCIAL

## 2024-11-13 ENCOUNTER — DOCUMENTATION ONLY (OUTPATIENT)
Dept: FAMILY MEDICINE | Facility: CLINIC | Age: 19
End: 2024-11-13

## 2024-11-13 DIAGNOSIS — B34.9 VIRAL ILLNESS: Primary | ICD-10-CM

## 2024-11-13 NOTE — PROGRESS NOTES
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 Played in game last night and felt ok, but developed some body aches last night. Taking Tylenol, ibuprofen. Seen at [place] earlier today where influenza, COVID and strep negative. No SOB, dyspnea, dysuria, or cough. Some congestion.          Medications:     Current Outpatient Medications   Medication Sig Dispense Refill    diclofenac (VOLTAREN) 75 MG EC tablet Take 1 tablet (75 mg) by mouth 2 times daily 14 tablet 0    ondansetron (ZOFRAN ODT) 4 MG ODT tab Take 2 tablets (8 mg) by mouth every 8 hours as needed for nausea (Patient not taking: Reported on 3/14/2023) 20 tablet 0     No current facility-administered medications for this visit.             Allergies:    No Known Allergies         Review of Systems:   A comprehensive 10 point review of systems (constitutional, ENT, cardiac, peripheral vascular, respiratory, GI, , Musculoskeletal, skin, Neurological) was performed and found to be negative except as described in this note.           Physical Exam:   COMPLETE EXAMINATION:   VITAL SIGNS: T 99   GEN: Alert, well-nourished, and in no distress.   HEENT:   Head: Normocephalic and atraumatic.   Eyes: PERRLA, EOMI  Nose: no congestion or discharge  Ears: TM's normal, external canals normal  Mouth/Throat: MMM, + erythema, no exudate or palatal petechia  Neck: supple, no lymphadenopathy  CV: S1S2 normal, RRR, no murmur  CHEST: CTA, Easy effort, No rales or wheezes  ABD: Soft. Nontender/nondistended, no HSM/mass, no rebound/guarding.  NEURO: Alert and oriented to person, place, and time. Gait normal.   SKIN: No rash, warmth or erythema  PSYCH: Mood, memory, affect and judgment normal.           Assessment:     Viral illness        Plan:      Symptomatic care.   Activity modifications until afebrile, then may return to sport as tolerated.   Discussed criteria for prompt reevaluation.    Martha Aggarwal M.D.    [facility] [place] was present for the entire visit.

## 2024-11-13 NOTE — LETTER
2024    Jaz Damon   2005        To Whom it May Concern;    Please excuse Jaz Damon from school due to illness on 2024.    Sincerely,        Martha Aggarwal MD

## 2024-11-14 NOTE — PROGRESS NOTES
[facility] ATHLETICS  Giovana ATC follow-up note  Date of service performed: 11/14/2024    Concern/injury: Illness    Assessment/plan: Reports feeling better today.  Slept well last night.  Notes waking up full of sweat.  No fever this morning.  Went to bed with a low grade temp hovering around 100F. Was continuing to take scheduled APAP and IBU through the day yesterday.  Reports body aches and head aches have resolved.  Notes worsening of sore throat.  Discussed with Dr. Alonso, we will plan to keep patient home today to rest.  Patient will need to be afebrile and off antipyretics for 24 hours prior to resuming athletic activity.  Patient understands and will follow up with me tomorrow.     Giovana ATC follow-up note  Date of service performed: 11/15/2024    Concern/injury: Illness    Assessment/plan: Doing better.  No fevers yesterday.  ST is improving.  No fever today.  Some R cervical adenopathy present.  No other symptoms reported.  Would like to return to practice today.  She feels good from an energy standpoint and has been keeping up with fluid/nutrition intake.  She will return to play as tolerated today.  She will otherwise follow up as need should she have new or worsening symptoms.    Diego Milian ATC

## 2024-11-14 NOTE — PROGRESS NOTES
[facility] ATHLETICS  Giovana ATC initial assessment note  Date of service performed: 11/13/2024    Concern: Acute illness  Body part: N/A  Description: N/A  Injury: N/A  Type: Non-athletics related  Date of illness: 11/12/2024    S: Jaz Damon reports to the [facility] this morning for evaluation of an illness.  She reports waking up this morning with a fever of 102.9F.  She is also having headaches, body aches and mild sore throat.  She also has mild nasal congestion.  Notes feeling good yesterday, but had a headache and felt achy following the game last night.  She denies GI symptoms, stomach pain, irregular heart beats, cough, new rashes.  She has no recent sick contacts.  She did not receive annual flu/COVID vaccine. She notes taking ibuprofen and Tylenol earlier this morning.  Last dose of IBU @ 6:30am; last dose of APAP @ 8:15am. Prior history of mono when younger.     O: General: healthy, alert, and no distress  Skin is unremarkable.  Oral Temp 99.5F.   Oropharynx: mild erythema, no tonsillar hypertrophy, no exudates present    A: Suspect viral illness    P: Plan to have seen this afternoon by team MD @ 1:40pm.  Patient also sent to [place] prior to the afternoon       appointment for influenza/COVID/Strep testing.  Discussed rest and oral hydration.  Discussed symptomatic treatment with OTC medications. Additionally discussed warm salt water gargles, honey, and honey with tea as additional means to soothe her sore throat. Team is off today, but discussed activity modifications in febrile athletes. She will let me know of worsening or new symptoms.  She should not hesitate to reach out with additional questions or concerns.  All questions answered to her satisfaction.    Diego Milian, ATC

## 2025-06-11 ENCOUNTER — DOCUMENTATION ONLY (OUTPATIENT)
Dept: FAMILY MEDICINE | Facility: CLINIC | Age: 20
End: 2025-06-11

## 2025-06-16 NOTE — PROGRESS NOTES
"[facility] ATHLETIC MEDICINE  [facility]   Sport Psychology Intake Note      Location of Visit: [facility] Athletic Department - [address]  Date of Visit: 6/11/2025  Duration of Session: 60 minutes  Referred by: teammate/friend and coaches    Jaz Damon presented on time for initial sport psychology appointment.     Emergency Contact Name and Relation to you (e.g., parent, guardian, , etc.)  Harriet Damon- parent  Emergency Contact Cell #:  [phone]      Jaz Damon is a 19 year old White, heterosexual female who uses [she/hers] pronouns.  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 She is not an international student.     What brings you into Sport Psychology at this time?  \"Just at a point in my career/life where I feel like I could use some mental help and counseling!\"    Please indicate whether any of the following concerns are true for you AND which ones you hope to address in sport psychology sessions. Manuel all that apply:  anxiety/worry  athletic performance  body image  burn-out in sport  confidence  depression/low mood  difficulties with coaches  dissatisfaction/unhappiness  family problems  low motivation  leadership  time management  transition/adjustment    Suicide and Risk Assessment:  Recent suicidal thoughts: No  Past suicidal thoughts: No  Recent homicidal thoughts: No  Any attempts in the past: No  Any family/friends/loved ones die by suicide: Yes: Father  Plan or considering various methods: No  Access to guns: No  Protective factors: no h/o suicide attempt, no plan or intent, no h/o risky impulsive behavior, no access to lethal means, h/o seeking help when needed, future oriented, none to minimal alcohol use , commitment to family, good social support  , Nondenominational beliefs, and stable housing  Verbal contract for safety: Yes    Jaz denies current urges to self-harm, homicidal ideation, suicidal ideation, means, plans, " "or intent.    Mental Status & Observations:  Jaz appeared generally alert and oriented. Dress was appropriate to the weather and occasion. Grooming and hygiene were appropriate. Eye contact was good. Speech was of normal volume and normal. Mood was tearful with congruent affect. Thought processes were relevant, logical and goal-directed. Thought content was within normal limits with no evidence of psychotic or paranoid features. Memory appeared intact. Insight and judgment appeared age appropriate with good focus in session.  She exhibited normal motor activity during the appointment.  Behavior was actively engaged, candid, congenial, cooperative, engaging, and open.     Family Background:  Jaz reports that she was born and raised in [place], [place] with her two younger sisters (20yo) and (13yo). Her parents are  and continue to reside in [place]. She notes that she is \"uber close\" to her sisters and goes home often to visit. She states that she was raised in a \"Catholic household.\"     Education:  Jaz reports that she attended public school and [employer]. She is currently earning her degree in finance from the [employer] at [employer]. She reports that she is interested in the area of mergers and acquisitions. She describe her study skills and academics as \"Good.\"    Social:  Jaz reports that growing up she had good friendships. She notes that although she had a good relationship with friends in high school, she is not as close to them now. She overall gets along \"great\" with teammates and peers, and states that she has a few \"super close\" friends on the team. She is in a romantic relationship with her boyfriend of 3 years who resides in [place], [place]. She describes him as her \"best friend.\" She is currently living with a teammate/roommate with whom she gets along with well.     Athletics:   Jaz reports that she grew-up playing \"lots of sports\" and describes herself as \"very " "competitive.\" She is currently a senior on the Women's Basketball team at [employer]. She began playing basketball at age 7yo. She plays forward. She reports that she historically has enjoyed a lot of playing time, but was challenged with this last season when a teammate was competitive with her for her position. Jaz describes her overall relationship with her coaches as \"fair.\" She states that she was recruited by and paid for a previous  at the [employer] with whom she felt very close and valued by. She notes that her new , she finds is \"hard to approach\" at times but that she is wanting to work on improving their relationship by doing more frequent \"check-ins.\" She also reports that she at times struggles with \"confidence, perfectionism, self-criticalness, and feeling valued if not playing.\" She reports that this is her final and last season playing basketball and therefore, she would like to work on \"broadening my identity outside basketball.\"     History of medical and mental health concerns:  Concussion: No    Current/past sports injury: No    Nutrition/eating/appetite: No  Body image dissatisfaction: Yes:     Sleep: No     Substance use (alcohol, caffeine, tobacco, cannabis, other): Yes: infrequently    Family history of substance abuse: Yes: Jaz reports that she does not talk about her father's alcoholism with anyone, but has navigated his addiction most of her life. She reports that her father struggled with significant alcoholism and was in/out of treatment many times which contributed to periods of time where her mother was operating as a single-parent. She states that she believes her father also struggles with anxiety and depression which have contributed to his alcoholism. She reports that his alcoholism \"really hurts me.\" She was crying when describing his alcoholism's impact on her and how \"it tore our family apart.\" She reports that it feels \"scary\" when he is drinking as he can " "become aggressive and emotionally hurtful towards others. She reports that he had an 8 yr period of sobriety while she was in HS and college; however, last year in May 2024 experienced a significant relapse. She also reports an extensive family history of alcoholism in her family including her paternal grandfather and father.     Medications/vitamins/supplements: none reported    Concentration/focus/ADHD: No    PTSD/trauma/abuse: No  Significant loss: No    Known history of mental health in self: None reported.     Known history of mental health in family member(s): Yes: Father - Depression, anxiety, alcoholism; youngest sister - anxiety, OCD.     Current mental health symptoms: Over the last 2 weeks, Jaz reports feeling bothered by feeling nervous, anxious or on edge Several days x week(1); Not being able to stop or control worrying: Several days (1); and Feeling down, depressed, or hopeless for Several days x week (1).  She reports self-observations regarding lacking self-confidence at times, perfectionism, rumination, self-criticalness, feeling as though her self-worth in based too much in sport at times, and wanting to \"let comments by others not affect how I think about myself as much.\"     Legal issues: No    Financial concerns: No   Receives full athletic scholarship    Sandi: Jaz states that her sandi is important to her and she identifies as Zoroastrianism. She reports that she participates in [employer] and a bible study.      Jaz describes her personality as \"Reserved at first, funny, lighthearted.\"    Coping skills, strengths and supports:   Communication skills, Coping skills, Exercise, Family support, Insight and sensitivity, Maturity and judgment, Motivation for change, Relationship stability, Worship/spirituality , Socioeconomic stability, Social support system, and Use of available services    Goals for counseling:  Jaz reports that she would like to 1) \"Be more than a basketball " "player and I want to talk about my future. 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 She appears open to challenging herself to use therapy for the first time to grow insight, self-awareness and learn emotionally healthy coping skills. She also has navigated a primary caregiver/parent struggle with alcoholism most of her life. She will likely benefit from interventions to help her explore her sense of self outside sport and have space/opportunities to express her emotions where she can be validated for her perspectives, insights and emotional experiences. She will also likely benefit from participating in therapy where she does not have to  internalize distress, but can receive support and engage  in open communication in a safe, nonjudgmental environment. Jaz seems insightful , openness to reflection and motivated to engage in therapeutic work. Early rapport was established, and the client appears well-positioned to benefit from ongoing psychological support.      Therapy objectives/goals:  Assist with transition out of sport  Build resilience and response to adversity  Decrease anxiety symptoms  Decrease depressive symptoms  Enhance self-advocacy  Increase mindfulness and the ability to be present  Increase self-esteem and self-worth  Increase willingness to be vulnerable and experience emotions  Lessen perfectionism  Separate self-worth from outcome/achievement  Teach and improve coping skills    Therapy follow-up plan:  Individual counseling sessions as needed      Main Durham, PhD LP, " [facility]                  Sport Psychology Initial Paperwork & Background Information  05/15/2025 at 1:08 PM    Name  Jaz Damon    Date  5/15/25    How did you hear about Sport Psych?  Teammates/coaches    Address  [address] [place], [address]    Phone  [phone]    Email  [email or web address]    Date of Birth  2005    Age  19    Your Personal Pronouns  No answer given.    Identified ethnicity (select all that apply)  White/  Sexual Orientation  Heterosexual  Gender Identification  Woman  International Student? If so, what country?  No answer given.    Do you speak other languages? If so, please list:  No answer given.    Class Standing  senior  Emergency Contact Name and Relation to you (e.g., parent, guardian, , etc.)  Harriet Damon- parent    Emergency Contact Cell #:  [phone]    What brings you into Sport Psychology at this time?  Just at a point in my career/life where I feel like I could use some mental help and counseling!    Please indicate whether any of the following concerns are true for you AND which ones you hope to address in sport psychology sessions. Manuel all that apply  anxiety/worry    athletic performance    body image    burn-out in sport    confidence    depression/low mood    difficulties with coaches    dissatisfaction/unhappiness    family problems    low motivation    leadership    time management    transition/adjustment    Please list your immediate family members (e.g., relation to you, age, relevant dynamics with you):  Filiberto- Sister    Trish- Sister    Harriet- Mom    West- Dad    Academic Major  Finance    How do you describe your study skills and academics?  Good  Do you receive an athletic or academic scholarship  yes, full scholarship    How would you rate your overall relationship with your teammates and peers?  Great  Are you in a dating/romantic relationship?  yes  Current sport, position, and age you started sport  Basketball, Forward,  started when I was 8    How would you rate your overall relationship with your coaches?  Fair  Are you currently experiencing financial strain?  no  Wellness Background-  Please check those that currently OR previously apply:  No answer given.    Have you previously worked with a sport psychology professional?  No  Have you ever received a mental health (MH) diagnosis, seen a MH professional or been prescribed a MH medication?  No  Is there a history of mental illness in your family?  Yes  Depression & anxiety  Have you experienced significant loss or grief?  no  Do you identify with a Sabianism, certain alex or spirituality?  yes  Faith  Is there any culturally relevant information that is important to you? (Describe)  No answer given.    How would you describe your personality?  Reserved at first, funny, lighthearted    Please list any medications or supplements you are taking  No answer given.    How often do you drink alcohol?  Infrequently  How often do you engage in recreational drug use?  Never  Any areas of your life that are stressful right now?  Basketball                PATIENT HEALTH QUESTIONNAIRE-4 (PHQ-4)  05/15/2025 at 1:14 PM    Over the last 2 weeks, how often have you been bothered by any of  the following problems?  Feeling nervous, anxious or on edge?: Several days (1)    Not being able to stop or control worrying: Several days (1)    Little interest or pleasure in doing things: Not at all (0)    Feeling down, depressed, or hopeless: Several days (1)    The Patient Health Questionnaire-4 (PHQ-4) was developed and validated by Robeoenke, Severo, Eris, & Löwe, (2009) in order to address the fact that anxiety and depression are two of the most prevalent illnesses among the general population._      Adverse Childhood Experience (ACE) Questionnaire  05/15/2025 at 1:12 PM    WHILE YOU WERE GROWING UP, DURING YOUR FIRST 18 YEARS OF LIFE:  1. Did a parent or other adult in the household often  swear at you, insult you, put you down, or humiliate you? Did they act in a way that made you afraid that you might be physically hurt?  No (0)  2. Did a parent or other adult in the household often push, grab, slap, or throw something at you? Did they ever hit you so hard that you had marks or were injured?  No (0)  3. Did an adult or person at least 5 years older than you ever touch or fondle you or have you touch their body in a sexual way? Did they try to or actually have oral, anal, or vaginal sex with you?  No (0)  4. Did you often feel that no one in your family loved you or thought you were important or special? Did you often feel as your family didn t look out for each other, feel close to each other, or support each other?  Yes (1)  5. Did you often feel that you didn t have enough to eat, had to wear dirty clothes, and had no one to protect you? Did you often feel that your parents were too drunk or high to take care of you or take you to the doctor if you needed it?  No (0)  6. Were your parents ever  or ?  No (0)  7. Was your mother or stepmother often pushed, grabbed, slapped, or had something thrown at her? Was she sometimes or often kicked, bitten, hit with a fist, or hit with something hard? Or ever repeatedly hit over at least a few minutes or threatened with a gun or knife?  No (0)  8. Did you live with anyone who was a problem drinker or alcoholic or who used street drugs?  Yes (1)  9. Was a household member depressed or mentally ill or did a household member attempt suicide?  Yes (1)  10. Did a household member go to care home?  No (0)  Now add up your  Yes  answers and enter the total below:  3    This is your ACE Score    Source: [facility] ACE Study, 1998.

## 2025-06-25 ENCOUNTER — DOCUMENTATION ONLY (OUTPATIENT)
Dept: FAMILY MEDICINE | Facility: CLINIC | Age: 20
End: 2025-06-25

## 2025-06-25 NOTE — PROGRESS NOTES
"[facility] ATHLETIC MEDICINE  [facility]   Sport Psychology Progress Note      Location of Visit: [facility] Athletic Department - [address]  Date of Visit: 6/25/2025  Duration of Session: 50 minutes  Referred by: teammate/friend and coaches    Jaz Damon presented on time for her sport psychology appointment.     Emergency Contact Name and Relation to you (e.g., parent, guardian, , etc.)  Harriet Damon- parent  Emergency Contact Cell #:  [phone]      Jaz Damon is a 19 year old White, heterosexual female who uses [she/hers] pronouns.  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 She is not an international student.     What brings you into Sport Psychology at this time?  \"Just at a point in my career/life where I feel like I could use some mental help and counseling!\"    Please indicate whether any of the following concerns are true for you AND which ones you hope to address in sport psychology sessions. Manuel all that apply:  anxiety/worry  athletic performance  body image  burn-out in sport  confidence  depression/low mood  difficulties with coaches  dissatisfaction/unhappiness  family problems  low motivation  leadership  time management  transition/adjustment    Suicide and Risk Assessment:  Recent suicidal thoughts: No  Past suicidal thoughts: No  Recent homicidal thoughts: No  Any attempts in the past: No  Any family/friends/loved ones die by suicide: Yes: Father  Plan or considering various methods: No  Access to guns: No  Protective factors: no h/o suicide attempt, no plan or intent, no h/o risky impulsive behavior, no access to lethal means, h/o seeking help when needed, future oriented, none to minimal alcohol use , commitment to family, good social support  , Adventist beliefs, and stable housing  Verbal contract for safety: Yes    Jaz denies current urges to self-harm, homicidal ideation, suicidal ideation, means, plans, or " "intent.    Mental Status & Observations:  Jaz appeared generally alert and oriented. Dress was appropriate to the weather and occasion. Grooming and hygiene were appropriate. Eye contact was good. Speech was of normal volume and normal. Mood was tearful with congruent affect. Thought processes were relevant, logical and goal-directed. Thought content was within normal limits with no evidence of psychotic or paranoid features. Memory appeared intact. Insight and judgment appeared age appropriate with good focus in session.  She exhibited normal motor activity during the appointment.  Behavior was actively engaged, candid, congenial, cooperative, engaging, and open.     Observations & Response:  Jaz reports that she attended a [employer] retreat that felt both validating to her with respect to experience with -athlete relationships, as well as anxiety-provoking as it made her confront thoughts about her future after college athletics. She expressed feeling anxious about her \"last year\" and finding her identity outside of sports.  She found validation in connecting with other student-athletes who shared similar experiences, particularly around  relationships, stating \"I'm not alone that I don't have amazing relationships with my coaches.\" The client identified a pattern of avoidance in various areas of her life, including academic work (\"I'll put it off\"), difficult conversations with coaches, and in social situations. She acknowledged procrastinating on a paper due today because she feels confused about the topic and disinterested. Jaz describes her overall relationship with her coaches as \"fair.\" She reports that her  can be \"hard to approach\" at times. She also reports that she at times struggles with procrastination, \"confidence, perfectionism, self-criticalness, and feeling valued if not playing.\" She reports that this is her final and last season playing basketball and she wants " "to \"enjoy it.\" She also reports wanting to work on \"broadening my identity outside basketball.\" Body image dissatisfaction: Yes: Current mental health symptoms: Over the last 2 weeks, Jaz reports feeling bothered by feeling nervous, anxious or on edge Several days x week(1); Not being able to stop or control worrying: Several days (1); and Feeling down, depressed, or hopeless for Several days x week (1).  She reports self-observations regarding lacking self-confidence at times, perfectionism, rumination, self-criticalness, feeling as though her self-worth in based too much in sport at times, and wanting to \"let comments by others not affect how I think about myself as much.\"   She is in a romantic relationship with her boyfriend of 3 years who resides in [place], [place]. She describes him as her \"best friend.\" She is currently living with a teammate/roommate and is a senior majoring in business.       Family history of substance abuse: Yes: Jaz reports that she does not talk about her father's alcoholism with anyone, but has navigated his addiction most of her life. She reports that her father struggled with significant alcoholism and was in/out of treatment many times which contributed to periods of time where her mother was operating as a single-parent. She states that she believes her father also struggles with anxiety and depression which have contributed to his alcoholism. She reports that his alcoholism \"really hurts me.\" She was crying when describing his alcoholism's impact on her and how \"it tore our family apart.\" She reports that it feels \"scary\" when he is drinking as he can become aggressive and emotionally hurtful towards others. She reports that he had an 8 yr period of sobriety while she was in  and college; however, last year in May 2024 experienced a significant relapse. She also reports an extensive family history of alcoholism in her family including her paternal grandfather and father. " "    Known history of mental health in family member(s): Yes: Father - Depression, anxiety, alcoholism; youngest sister - anxiety, OCD.     Intervention:  Utilized psychoeducation about avoidance behaviors and their relationship to anxiety. Introduced the concept of \"unpleasant emotions\" (anxiety, distress, discomfort) and explained how humans are wired to avoid these feelings, particularly those with higher baseline anxiety. Taught Jaz about exposure therapy principles, explaining that approaching rather than avoiding anxiety-provoking situations ultimately reduces anxiety/distress over time. She agreed and expressed understanding, sharing several examples. Used metaphors and examples to illustrate this concept. Assisted Jaz in identifying specific avoidance patterns in her life and encouraged her to document instances when she notices avoidance and successfully approaches uncomfortable situations. Also validated Jaz's insights about family patterns of avoidance, noting similarities between her and her sister's behaviors, as well as her father with alcoholism. Discussed practicing approaching behaviors for homework this week including follow up with business contacts from the leadership retreat via email; have a face-to-face conversation with her  about concerns rather than avoiding the discussion; and completing academic assignments promptly rather than procrastinating. Next session scheduled for Tuesday, July 8th at 10 or 11 AM.    Jaz demonstrated good insight into her pattern of avoidance behaviors across multiple domains (academics, interpersonal communication, career planning). She made connections between her anxiety about post-college identity and her tendency to avoid tasks that trigger uncomfortable emotions - she identified several tasks that she plans to do to help with career planning this week.. The client showed progress in recognizing how avoidance provides short-term " "relief but creates longer-term problems. She was receptive to psychoeducation about exposure principles and made connections to her own experiences and family patterns. The client appears to be at a developmental transition point, struggling with identity formation beyond her athletic role and experiencing anticipatory anxiety about post-graduation life. Her avoidance behaviors are currently interfering with academic performance and potentially limiting her career networking opportunities.    Coping skills, strengths and supports:   Communication skills, Coping skills, Exercise, Family support, Insight and sensitivity, Maturity and judgment, Motivation for change, Relationship stability, Holiness/spirituality , Socioeconomic stability, Social support system, and Use of available services    Goals for counseling:  Jaz reports that she would like to 1) \"Be more than a  and I want to talk about my future. 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 She appears open to challenging herself to use therapy for the first time to grow insight, self-awareness and learn emotionally healthy coping skills. She also has navigated a primary caregiver/parent struggle with alcoholism most of her life. She will likely benefit from interventions to help her explore her sense of self outside sport and have space/opportunities to express her emotions where she can be validated for her perspectives, insights and emotional experiences. She will also likely benefit from participating in therapy where she does not " have to  internalize distress, but can receive support and engage  in open communication in a safe, nonjudgmental environment. Jaz seems insightful , openness to reflection and motivated to engage in therapeutic work. Early rapport was established, and the client appears well-positioned to benefit from ongoing psychological support.      Therapy objectives/goals:  Assist with transition out of sport  Build resilience and response to adversity  Decrease anxiety symptoms  Decrease depressive symptoms  Enhance self-advocacy  Increase mindfulness and the ability to be present  Increase self-esteem and self-worth  Increase willingness to be vulnerable and experience emotions  Lessen perfectionism  Separate self-worth from outcome/achievement  Teach and improve coping skills    Therapy follow-up plan:  Individual counseling sessions as needed      Main Durham, PhD LP, CMPC

## 2025-07-08 ENCOUNTER — DOCUMENTATION ONLY (OUTPATIENT)
Dept: FAMILY MEDICINE | Facility: CLINIC | Age: 20
End: 2025-07-08

## 2025-07-08 NOTE — PROGRESS NOTES
"[facility] ATHLETIC MEDICINE  [facility]   Sport Psychology Progress Note      Location of Visit: [facility] Athletic Department - [address]  Date of Visit: 7/8/2025  Duration of Session: 50 minutes  Referred by: teammate/friend and coaches    Jaz Damon presented on time for her sport psychology appointment.     Emergency Contact Name and Relation to you (e.g., parent, guardian, , etc.)  Harriet Damon- parent  Emergency Contact Cell #:  [phone]      Jaz Damon is a 19 year old White, heterosexual female who uses [she/hers] pronouns.  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 She is not an international student.     What brings you into Sport Psychology at this time?  \"Just at a point in my career/life where I feel like I could use some mental help and counseling!\"    Please indicate whether any of the following concerns are true for you AND which ones you hope to address in sport psychology sessions. Manuel all that apply:  anxiety/worry  athletic performance  body image  burn-out in sport  confidence  depression/low mood  difficulties with coaches  dissatisfaction/unhappiness  family problems  low motivation  leadership  time management  transition/adjustment    Suicide and Risk Assessment:  Recent suicidal thoughts: No  Past suicidal thoughts: No  Recent homicidal thoughts: No  Any attempts in the past: No  Any family/friends/loved ones die by suicide: Yes: Father  Plan or considering various methods: No  Access to guns: No  Protective factors: no h/o suicide attempt, no plan or intent, no h/o risky impulsive behavior, no access to lethal means, h/o seeking help when needed, future oriented, none to minimal alcohol use , commitment to family, good social support  , Scientologist beliefs, and stable housing  Verbal contract for safety: Yes    Jaz denies current urges to self-harm, homicidal ideation, suicidal ideation, means, plans, or " "intent.    Mental Status & Observations:  Jaz appeared generally alert and oriented. Dress was appropriate to the weather and occasion. Grooming and hygiene were appropriate. Eye contact was good. Speech was of normal volume and normal. Mood was tearful with congruent affect. Thought processes were relevant, logical and goal-directed. Thought content was within normal limits with no evidence of psychotic or paranoid features. Memory appeared intact. Insight and judgment appeared age appropriate with good focus in session.  She exhibited normal motor activity during the appointment.  Behavior was actively engaged, candid, congenial, cooperative, engaging, and open.     Observations & Response:  Jaz reports that she used \"approaching\" behavior in academics and in her romantic relationship with her boyfriend this past week and noticed it helped with stress. She reports continuing to feel uncertain and uneasy about approaching her  about how she feels hurt by her at times and is conflicted about what to do. She reports encouragement from  to address concerns directly. She also reports that she at times struggles with procrastination, \"confidence, perfectionism, self-criticalness, and self-worth being based on playing time and others' opinions of her. She reports that this is her final and last season playing basketball and she wants to \"enjoy it.\" She also reports wanting to work on \"broadening my identity outside basketball.\" She is in a romantic relationship with her boyfriend of 3 years who resides in [place], [place]. She describes him as her \"best friend.\" She is currently living with a teammate/roommate and is a senior majoring in business.     Family history of substance abuse: Yes: Jaz reports that she does not talk about her father's alcoholism with anyone, but has navigated his addiction most of her life. She reports that her father struggled with significant alcoholism and " "was in/out of treatment many times which contributed to periods of time where her mother was operating as a single-parent. She states that she believes her father also struggles with anxiety and depression which have contributed to his alcoholism. She reports that his alcoholism \"really hurts me.\" She was crying when describing his alcoholism's impact on her and how \"it tore our family apart.\" She reports that it feels \"scary\" when he is drinking as he can become aggressive and emotionally hurtful towards others. She reports that he had an 8 yr period of sobriety while she was in  and college; however, last year in May 2024 experienced a significant relapse. She also reports an extensive family history of alcoholism in her family including her paternal grandfather and father.     Known history of mental health in family member(s): Yes: Father - Depression, anxiety, alcoholism; youngest sister - anxiety, OCD.       Interventions:   Worked to help jen process her emotional experiences with her . Jen shared observed behaviors by her  that feel concerning to her regarding a lack of professional boundaries at times and unprofessional behavior socially with players. Jen described multiple instances where this  appears to create team division by showing favoritism, making disparaging comments about players, and maintaining what Jen feels is behavior that undermines emotional vulnerability/safety. Validated Jen's emotions and experiences.  Worked to help her increase her emotional regulation by recognizing she cannot control others' perceptions but can manage her own responses. Used cognitive reframing to help Jen separate others' perceptions/comments/behavior toward or about her from her own worth, and helped her with values clarification to maintain her own emotional wellbeing. Next session scheduled for Wednesday, July 16th at 11:00 AM.     Coping skills, " "strengths and supports:   Communication skills, Coping skills, Exercise, Family support, Insight and sensitivity, Maturity and judgment, Motivation for change, Relationship stability, Nondenominational/spirituality , Socioeconomic stability, Social support system, and Use of available services    Goals for counseling:  Jaz reports that she would like to 1) \"Be more than a  and I want to talk about my future. 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 She appears open to challenging herself to use therapy for the first time to grow insight, self-awareness and learn emotionally healthy coping skills. She also has navigated a primary caregiver/parent struggle with alcoholism most of her life. She will likely benefit from interventions to help her explore her sense of self outside sport and have space/opportunities to express her emotions where she can be validated for her perspectives, insights and emotional experiences. She will also likely benefit from participating in therapy where she does not have to  internalize distress, but can receive support and engage  in open communication in a safe, nonjudgmental environment. Jaz seems insightful , openness to reflection and motivated to engage in therapeutic work. Early rapport was established, and the client appears well-positioned to benefit from ongoing psychological support.      Therapy objectives/goals:  Assist with transition out of sport  Build resilience and response to adversity  Decrease anxiety symptoms  Decrease depressive symptoms  Enhance " self-advocacy  Increase mindfulness and the ability to be present  Increase self-esteem and self-worth  Increase willingness to be vulnerable and experience emotions  Lessen perfectionism  Separate self-worth from outcome/achievement  Teach and improve coping skills    Therapy follow-up plan:  Individual counseling sessions as needed      Main Durham, PhD LP, CMPC

## 2025-07-16 ENCOUNTER — DOCUMENTATION ONLY (OUTPATIENT)
Dept: FAMILY MEDICINE | Facility: CLINIC | Age: 20
End: 2025-07-16

## 2025-07-16 NOTE — PROGRESS NOTES
"[facility] ATHLETIC MEDICINE  [facility]   Sport Psychology Progress Note      Location of Visit: [facility] Athletic Department - [address]  Date of Visit: 7/16/2025  Duration of Session: 50 minutes  Referred by: teammate/friend and coaches    Jaz Damon presented on time for her sport psychology appointment.     Emergency Contact Name and Relation to you (e.g., parent, guardian, , etc.)  Harriet Damon- parent  Emergency Contact Cell #:  [phone]      Jaz Damon is a 19 year old White, heterosexual female who uses [she/hers] pronouns.  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 She is not an international student.     What brings you into Sport Psychology at this time?  \"Just at a point in my career/life where I feel like I could use some mental help and counseling!\"    Please indicate whether any of the following concerns are true for you AND which ones you hope to address in sport psychology sessions. Manuel all that apply:  anxiety/worry  athletic performance  body image  burn-out in sport  confidence  depression/low mood  difficulties with coaches  dissatisfaction/unhappiness  family problems  low motivation  leadership  time management  transition/adjustment    Suicide and Risk Assessment:  Recent suicidal thoughts: No  Past suicidal thoughts: No  Recent homicidal thoughts: No  Any attempts in the past: No  Any family/friends/loved ones die by suicide: Yes: Father  Plan or considering various methods: No  Access to guns: No  Protective factors: no h/o suicide attempt, no plan or intent, no h/o risky impulsive behavior, no access to lethal means, h/o seeking help when needed, future oriented, none to minimal alcohol use , commitment to family, good social support  , Sabianism beliefs, and stable housing  Verbal contract for safety: Yes    Jaz denies current urges to self-harm, homicidal ideation, suicidal ideation, means, plans, or " "intent.    Mental Status & Observations:  Jaz appeared generally alert and oriented. Dress was appropriate to the weather and occasion. Grooming and hygiene were appropriate. Eye contact was good. Speech was of normal volume and normal. Mood was appropriate with congruent affect. Thought processes were relevant, logical and goal-directed. Thought content was within normal limits with no evidence of psychotic or paranoid features. Memory appeared intact. Insight and judgment appeared age appropriate with good focus in session.  She exhibited normal motor activity during the appointment.  Behavior was actively engaged, candid, congenial, cooperative, engaging, and open.     Observations & Response:  Jaz reports that she used cognitive reframing with an interpersonal interaction with her  that allowed her to not personalize the situation as or ruminate on it much.  She reports that she has not yet followed-up on her \"career-planning\" tasks that she outlined for herself a few weeks ago. She states that will be attending a family vacation in [place] for a week in August. She reports also wanting to work on preventing burn-out and ensuring enjoyment in basketball during her final year. She reports that at times she struggles with procrastination, \"confidence, perfectionism, self-criticalness, and self-worth being based on playing time\" and others' opinions of her. She also reports wanting to work on \"broadening my identity outside basketball.\" She is in a romantic relationship with her boyfriend of 3 years who resides in [place], [place]. She describes him as her \"best friend.\" She is currently living with a teammate/roommate and is a senior majoring in business.     Family history of substance abuse: Yes: Jaz reports that she does not talk about her father's alcoholism with anyone, but has navigated his addiction most of her life. She reports that her father struggled with significant alcoholism and was " "in/out of treatment many times which contributed to periods of time where her mother was operating as a single-parent. She states that she believes her father also struggles with anxiety and depression which have contributed to his alcoholism. She reports that his alcoholism \"really hurts me.\" She was crying when describing his alcoholism's impact on her and how \"it tore our family apart.\" She reports that it feels \"scary\" when he is drinking as he can become aggressive and emotionally hurtful towards others. She reports that he had an 8 yr period of sobriety while she was in HS and college; however, last year in May 2024 experienced a significant relapse. She also reports an extensive family history of alcoholism in her family including her paternal grandfather and father.     Known history of mental health in family member(s): Yes: Father - Depression, anxiety, alcoholism; youngest sister - anxiety, OCD.       Interventions:   Processed use of CBT skill of cognitive reframing in her interpersonal relationships. She shared feeling more empowered in how to mentally respond to situations. Explored taking a pro-active approach in communication, planning for adversity and how she wants to respond to interpersonal situations. Explored feelings about traveling with her father and family for a week with resepct to alcohol use and approaching needed conversations if/when something arise on vacation. Worked to emphasize the continued skill of \"approaching\" rather than avoiding. Jaz was very receptive.    Coping skills, strengths and supports:   Communication skills, Coping skills, Exercise, Family support, Insight and sensitivity, Maturity and judgment, Motivation for change, Relationship stability, Zoroastrianism/spirituality , Socioeconomic stability, Social support system, and Use of available services    Goals for counseling:  Jaz reports that she would like to 1) \"Be more than a  and I want to talk " "about my future. 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 She appears open to challenging herself to use therapy for the first time to grow insight, self-awareness and learn emotionally healthy coping skills. She also has navigated a primary caregiver/parent struggle with alcoholism most of her life. She will likely benefit from interventions to help her explore her sense of self outside sport and have space/opportunities to express her emotions where she can be validated for her perspectives, insights and emotional experiences. She will also likely benefit from participating in therapy where she does not have to  internalize distress, but can receive support and engage  in open communication in a safe, nonjudgmental environment. Jaz seems insightful , openness to reflection and motivated to engage in therapeutic work. Early rapport was established, and the client appears well-positioned to benefit from ongoing psychological support.      Therapy objectives/goals:  Assist with transition out of sport  Build resilience and response to adversity  Decrease anxiety symptoms  Decrease depressive symptoms  Enhance self-advocacy  Increase mindfulness and the ability to be present  Increase self-esteem and self-worth  Increase willingness to be vulnerable and experience emotions  Lessen perfectionism  Separate self-worth from outcome/achievement  Teach and improve coping skills    Therapy follow-up plan:  Individual counseling sessions as needed      Main Durham, PhD LP, CMPC                 "

## 2025-07-31 ENCOUNTER — DOCUMENTATION ONLY (OUTPATIENT)
Dept: FAMILY MEDICINE | Facility: CLINIC | Age: 20
End: 2025-07-31

## 2025-07-31 NOTE — PROGRESS NOTES
"[facility] ATHLETIC MEDICINE  [facility]   Sport Psychology Progress Note      Location of Visit: [facility] Athletic Department - [address]  Date of Visit: 7/31/2025  Duration of Session: 50 minutes  Referred by: teammate/friend and coaches    Jaz Damon presented on time for her sport psychology appointment.     Emergency Contact Name and Relation to you (e.g., parent, guardian, , etc.)  Harriet Damon- parent  Emergency Contact Cell #:  [phone]      Jaz Damon is a 19 year old White, heterosexual female who uses [she/hers] pronouns.  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 She is not an international student.     What brings you into Sport Psychology at this time?  \"Just at a point in my career/life where I feel like I could use some mental help and counseling!\"    Please indicate whether any of the following concerns are true for you AND which ones you hope to address in sport psychology sessions. Manuel all that apply:  anxiety/worry  athletic performance  body image  burn-out in sport  confidence  depression/low mood  difficulties with coaches  dissatisfaction/unhappiness  family problems  low motivation  leadership  time management  transition/adjustment    Suicide and Risk Assessment:  Recent suicidal thoughts: No  Past suicidal thoughts: No  Recent homicidal thoughts: No  Any attempts in the past: No  Any family/friends/loved ones die by suicide: Yes: Father  Plan or considering various methods: No  Access to guns: No  Protective factors: no h/o suicide attempt, no plan or intent, no h/o risky impulsive behavior, no access to lethal means, h/o seeking help when needed, future oriented, none to minimal alcohol use , commitment to family, good social support  , Temple beliefs, and stable housing  Verbal contract for safety: Yes    Jaz denies current urges to self-harm, homicidal ideation, suicidal ideation, means, plans, or " "intent.    Mental Status & Observations:  Jaz appeared generally alert and oriented. Dress was appropriate to the weather and occasion. Grooming and hygiene were appropriate. Eye contact was good. Speech was of normal volume and normal. Mood was appropriate with congruent affect. Thought processes were relevant, logical and goal-directed. Thought content was within normal limits with no evidence of psychotic or paranoid features. Memory appeared intact. Insight and judgment appeared age appropriate with good focus in session.  She exhibited normal motor activity during the appointment.  Behavior was actively engaged, candid, congenial, cooperative, engaging, and open.     Observations & Response:  Jaz reports that she is excited to go on a family vacation next week and have a break from sport. She reports that her academic semester starting in September will be busy. She reports that she has not yet followed-up on her \"career-planning\" tasks that she outlined for herself a few weeks ago. She reports also wanting to work on preventing burn-out and ensuring enjoyment in basketball during her final year. She reports that at times she struggles with procrastination, \"confidence, perfectionism, self-criticalness, and self-worth being based on playing time\" and others' opinions of her. She also reports wanting to work on \"broadening my identity outside basketball.\" She is in a romantic relationship with her boyfriend of 3 years who resides in [place], [place]. She describes him as her \"best friend.\" She is currently living with a teammate/roommate and is a senior majoring in business.     Family history of substance abuse: Yes: Jaz reports that she does not talk about her father's alcoholism with anyone, but has navigated his addiction most of her life. She reports that her father struggled with significant alcoholism and was in/out of treatment many times which contributed to periods of time where her mother was " "operating as a single-parent. She states that she believes her father also struggles with anxiety and depression which have contributed to his alcoholism. She reports that his alcoholism \"really hurts me.\" She was crying when describing his alcoholism's impact on her and how \"it tore our family apart.\" She reports that it feels \"scary\" when he is drinking as he can become aggressive and emotionally hurtful towards others. She reports that he had an 8 yr period of sobriety while she was in HS and college; however, last year in May 2024 experienced a significant relapse. She also reports an extensive family history of alcoholism in her family including her paternal grandfather and father.     Known history of mental health in family member(s): Yes: Father - Depression, anxiety, alcoholism; youngest sister - anxiety, OCD.       Interventions:   Explored Jaz's tendency to avoid when anxious or fearful of upsetting others, sometimes to the detriment of her own needs/wants. Discussed being more direct and assertive and brining courage to interpersonal interactions where she can be more self-advocating at times and how this skills aligns with \"approaching\" behavior. She also shared that she hopes to schedule study time for herself on weekends a bit more this school year and not procrastinate as much. Jaz shared some anxiety in the past year around body composition procedures within her sport, and asked how to handle some of the situational factors that contribute to anxiety.     Coping skills, strengths and supports:   Communication skills, Coping skills, Exercise, Family support, Insight and sensitivity, Maturity and judgment, Motivation for change, Relationship stability, Holiness/spirituality , Socioeconomic stability, Social support system, and Use of available services    Goals for counseling:  Jaz reports that she would like to 1) \"Be more than a  and I want to talk about my future. 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 She appears open to challenging herself to use therapy for the first time to grow insight, self-awareness and learn emotionally healthy coping skills. She also has navigated a primary caregiver/parent struggle with alcoholism most of her life. She will likely benefit from interventions to help her explore her sense of self outside sport and have space/opportunities to express her emotions where she can be validated for her perspectives, insights and emotional experiences. She will also likely benefit from participating in therapy where she does not have to  internalize distress, but can receive support and engage  in open communication in a safe, nonjudgmental environment. Jaz seems insightful , openness to reflection and motivated to engage in therapeutic work. Early rapport was established, and the client appears well-positioned to benefit from ongoing psychological support.      Therapy objectives/goals:  Assist with transition out of sport  Build resilience and response to adversity  Decrease anxiety symptoms  Decrease depressive symptoms  Enhance self-advocacy  Increase mindfulness and the ability to be present  Increase self-esteem and self-worth  Increase willingness to be vulnerable and experience emotions  Lessen perfectionism  Separate self-worth from outcome/achievement  Teach and improve coping skills    Therapy follow-up plan:  Individual counseling sessions as needed      Main Durham, PhD LP, [facility]                 "

## 2025-08-25 ENCOUNTER — DOCUMENTATION ONLY (OUTPATIENT)
Dept: FAMILY MEDICINE | Facility: CLINIC | Age: 20
End: 2025-08-25

## 2025-09-04 ENCOUNTER — DOCUMENTATION ONLY (OUTPATIENT)
Dept: FAMILY MEDICINE | Facility: CLINIC | Age: 20
End: 2025-09-04